# Patient Record
Sex: MALE | Race: WHITE | NOT HISPANIC OR LATINO | Employment: UNEMPLOYED | URBAN - METROPOLITAN AREA
[De-identification: names, ages, dates, MRNs, and addresses within clinical notes are randomized per-mention and may not be internally consistent; named-entity substitution may affect disease eponyms.]

---

## 2017-10-15 ENCOUNTER — HOSPITAL ENCOUNTER (OUTPATIENT)
Facility: MEDICAL CENTER | Age: 22
End: 2017-10-16
Attending: EMERGENCY MEDICINE | Admitting: INTERNAL MEDICINE

## 2017-10-15 DIAGNOSIS — F15.93 WITHDRAWAL FROM OTHER STIMULANT DRUG (HCC): ICD-10-CM

## 2017-10-15 LAB
ALBUMIN SERPL BCP-MCNC: 3.8 G/DL (ref 3.2–4.9)
ALBUMIN/GLOB SERPL: 1.9 G/DL
ALP SERPL-CCNC: 50 U/L (ref 30–99)
ALT SERPL-CCNC: 8 U/L (ref 2–50)
ANION GAP SERPL CALC-SCNC: 8 MMOL/L (ref 0–11.9)
AST SERPL-CCNC: 12 U/L (ref 12–45)
BILIRUB SERPL-MCNC: 0.7 MG/DL (ref 0.1–1.5)
BUN SERPL-MCNC: 10 MG/DL (ref 8–22)
CALCIUM SERPL-MCNC: 7.5 MG/DL (ref 8.5–10.5)
CHLORIDE SERPL-SCNC: 113 MMOL/L (ref 96–112)
CO2 SERPL-SCNC: 20 MMOL/L (ref 20–33)
CREAT SERPL-MCNC: 0.68 MG/DL (ref 0.5–1.4)
GFR SERPL CREATININE-BSD FRML MDRD: >60 ML/MIN/1.73 M 2
GLOBULIN SER CALC-MCNC: 2 G/DL (ref 1.9–3.5)
GLUCOSE SERPL-MCNC: 68 MG/DL (ref 65–99)
POTASSIUM SERPL-SCNC: 3.3 MMOL/L (ref 3.6–5.5)
PROT SERPL-MCNC: 5.8 G/DL (ref 6–8.2)
SODIUM SERPL-SCNC: 141 MMOL/L (ref 135–145)

## 2017-10-15 PROCEDURE — 36415 COLL VENOUS BLD VENIPUNCTURE: CPT

## 2017-10-15 PROCEDURE — 700105 HCHG RX REV CODE 258: Performed by: EMERGENCY MEDICINE

## 2017-10-15 PROCEDURE — 80053 COMPREHEN METABOLIC PANEL: CPT

## 2017-10-15 PROCEDURE — 96361 HYDRATE IV INFUSION ADD-ON: CPT

## 2017-10-15 PROCEDURE — 99285 EMERGENCY DEPT VISIT HI MDM: CPT

## 2017-10-15 PROCEDURE — 96374 THER/PROPH/DIAG INJ IV PUSH: CPT

## 2017-10-15 PROCEDURE — 700111 HCHG RX REV CODE 636 W/ 250 OVERRIDE (IP): Performed by: EMERGENCY MEDICINE

## 2017-10-15 RX ORDER — SODIUM CHLORIDE 9 MG/ML
1000 INJECTION, SOLUTION INTRAVENOUS ONCE
Status: COMPLETED | OUTPATIENT
Start: 2017-10-15 | End: 2017-10-16

## 2017-10-15 RX ORDER — LORAZEPAM 2 MG/ML
0.5 INJECTION INTRAMUSCULAR ONCE
Status: COMPLETED | OUTPATIENT
Start: 2017-10-15 | End: 2017-10-15

## 2017-10-15 RX ADMIN — SODIUM CHLORIDE 1000 ML: 9 INJECTION, SOLUTION INTRAVENOUS at 23:31

## 2017-10-15 RX ADMIN — LORAZEPAM 0.5 MG: 2 INJECTION INTRAMUSCULAR at 23:31

## 2017-10-16 ENCOUNTER — RESOLUTE PROFESSIONAL BILLING HOSPITAL PROF FEE (OUTPATIENT)
Dept: HOSPITALIST | Facility: MEDICAL CENTER | Age: 22
End: 2017-10-16

## 2017-10-16 VITALS
BODY MASS INDEX: 19.5 KG/M2 | RESPIRATION RATE: 16 BRPM | HEIGHT: 72 IN | DIASTOLIC BLOOD PRESSURE: 108 MMHG | SYSTOLIC BLOOD PRESSURE: 138 MMHG | HEART RATE: 98 BPM | OXYGEN SATURATION: 96 % | TEMPERATURE: 98.3 F | WEIGHT: 144 LBS

## 2017-10-16 PROBLEM — R00.0 SINUS TACHYCARDIA: Status: ACTIVE | Noted: 2017-10-16

## 2017-10-16 PROBLEM — G93.40 ENCEPHALOPATHY: Status: ACTIVE | Noted: 2017-10-16

## 2017-10-16 PROCEDURE — A9270 NON-COVERED ITEM OR SERVICE: HCPCS | Performed by: EMERGENCY MEDICINE

## 2017-10-16 PROCEDURE — 700111 HCHG RX REV CODE 636 W/ 250 OVERRIDE (IP): Performed by: INTERNAL MEDICINE

## 2017-10-16 PROCEDURE — G0378 HOSPITAL OBSERVATION PER HR: HCPCS

## 2017-10-16 PROCEDURE — 700105 HCHG RX REV CODE 258: Performed by: EMERGENCY MEDICINE

## 2017-10-16 PROCEDURE — 99204 OFFICE O/P NEW MOD 45 MIN: CPT | Performed by: INTERNAL MEDICINE

## 2017-10-16 PROCEDURE — 700105 HCHG RX REV CODE 258: Performed by: INTERNAL MEDICINE

## 2017-10-16 PROCEDURE — 96376 TX/PRO/DX INJ SAME DRUG ADON: CPT

## 2017-10-16 PROCEDURE — 96361 HYDRATE IV INFUSION ADD-ON: CPT

## 2017-10-16 PROCEDURE — 700102 HCHG RX REV CODE 250 W/ 637 OVERRIDE(OP): Performed by: EMERGENCY MEDICINE

## 2017-10-16 RX ORDER — LORAZEPAM 1 MG/1
1 TABLET ORAL EVERY 6 HOURS PRN
Status: DISCONTINUED | OUTPATIENT
Start: 2017-10-16 | End: 2017-10-16 | Stop reason: HOSPADM

## 2017-10-16 RX ORDER — PROMETHAZINE HYDROCHLORIDE 25 MG/1
12.5-25 TABLET ORAL EVERY 4 HOURS PRN
Status: DISCONTINUED | OUTPATIENT
Start: 2017-10-16 | End: 2017-10-16 | Stop reason: HOSPADM

## 2017-10-16 RX ORDER — AMOXICILLIN 250 MG
2 CAPSULE ORAL 2 TIMES DAILY
Status: DISCONTINUED | OUTPATIENT
Start: 2017-10-16 | End: 2017-10-16 | Stop reason: HOSPADM

## 2017-10-16 RX ORDER — SODIUM CHLORIDE 9 MG/ML
INJECTION, SOLUTION INTRAVENOUS CONTINUOUS
Status: DISCONTINUED | OUTPATIENT
Start: 2017-10-16 | End: 2017-10-16 | Stop reason: HOSPADM

## 2017-10-16 RX ORDER — POLYETHYLENE GLYCOL 3350 17 G/17G
1 POWDER, FOR SOLUTION ORAL
Status: DISCONTINUED | OUTPATIENT
Start: 2017-10-16 | End: 2017-10-16 | Stop reason: HOSPADM

## 2017-10-16 RX ORDER — PROMETHAZINE HYDROCHLORIDE 25 MG/1
12.5-25 SUPPOSITORY RECTAL EVERY 4 HOURS PRN
Status: DISCONTINUED | OUTPATIENT
Start: 2017-10-16 | End: 2017-10-16 | Stop reason: HOSPADM

## 2017-10-16 RX ORDER — LORAZEPAM 2 MG/ML
0.5 INJECTION INTRAMUSCULAR EVERY 6 HOURS PRN
Status: DISCONTINUED | OUTPATIENT
Start: 2017-10-16 | End: 2017-10-16

## 2017-10-16 RX ORDER — DIAZEPAM 5 MG/1
5 TABLET ORAL ONCE
Status: COMPLETED | OUTPATIENT
Start: 2017-10-16 | End: 2017-10-16

## 2017-10-16 RX ORDER — ONDANSETRON 2 MG/ML
4 INJECTION INTRAMUSCULAR; INTRAVENOUS EVERY 4 HOURS PRN
Status: DISCONTINUED | OUTPATIENT
Start: 2017-10-16 | End: 2017-10-16 | Stop reason: HOSPADM

## 2017-10-16 RX ORDER — BISACODYL 10 MG
10 SUPPOSITORY, RECTAL RECTAL
Status: DISCONTINUED | OUTPATIENT
Start: 2017-10-16 | End: 2017-10-16 | Stop reason: HOSPADM

## 2017-10-16 RX ORDER — LORAZEPAM 1 MG/1
0.5 TABLET ORAL EVERY 6 HOURS PRN
Status: DISCONTINUED | OUTPATIENT
Start: 2017-10-16 | End: 2017-10-16

## 2017-10-16 RX ORDER — ONDANSETRON 4 MG/1
4 TABLET, ORALLY DISINTEGRATING ORAL EVERY 4 HOURS PRN
Status: DISCONTINUED | OUTPATIENT
Start: 2017-10-16 | End: 2017-10-16 | Stop reason: HOSPADM

## 2017-10-16 RX ORDER — LORAZEPAM 2 MG/ML
1 INJECTION INTRAMUSCULAR EVERY 6 HOURS PRN
Status: DISCONTINUED | OUTPATIENT
Start: 2017-10-16 | End: 2017-10-16 | Stop reason: HOSPADM

## 2017-10-16 RX ORDER — SODIUM CHLORIDE 9 MG/ML
1000 INJECTION, SOLUTION INTRAVENOUS ONCE
Status: COMPLETED | OUTPATIENT
Start: 2017-10-16 | End: 2017-10-16

## 2017-10-16 RX ORDER — ACETAMINOPHEN 325 MG/1
650 TABLET ORAL EVERY 6 HOURS PRN
Status: DISCONTINUED | OUTPATIENT
Start: 2017-10-16 | End: 2017-10-16 | Stop reason: HOSPADM

## 2017-10-16 RX ADMIN — SODIUM CHLORIDE 1000 ML: 9 INJECTION, SOLUTION INTRAVENOUS at 01:15

## 2017-10-16 RX ADMIN — LORAZEPAM 1 MG: 2 INJECTION INTRAMUSCULAR; INTRAVENOUS at 03:02

## 2017-10-16 RX ADMIN — DIAZEPAM 5 MG: 5 TABLET ORAL at 01:15

## 2017-10-16 RX ADMIN — SODIUM CHLORIDE: 9 INJECTION, SOLUTION INTRAVENOUS at 04:08

## 2017-10-16 ASSESSMENT — LIFESTYLE VARIABLES
EVER HAD A DRINK FIRST THING IN THE MORNING TO STEADY YOUR NERVES TO GET RID OF A HANGOVER: NO
HAVE YOU EVER FELT YOU SHOULD CUT DOWN ON YOUR DRINKING: NO
HOW MANY TIMES IN THE PAST YEAR HAVE YOU HAD 5 OR MORE DRINKS IN A DAY: 0
TOTAL SCORE: 0
ALCOHOL_USE: YES
EVER FELT BAD OR GUILTY ABOUT YOUR DRINKING: NO
CONSUMPTION TOTAL: NEGATIVE
AVERAGE NUMBER OF DAYS PER WEEK YOU HAVE A DRINK CONTAINING ALCOHOL: 1
TOTAL SCORE: 0
EVER_SMOKED: NEVER
TOTAL SCORE: 0
HAVE PEOPLE ANNOYED YOU BY CRITICIZING YOUR DRINKING: NO
ON A TYPICAL DAY WHEN YOU DRINK ALCOHOL HOW MANY DRINKS DO YOU HAVE: 1

## 2017-10-16 ASSESSMENT — ENCOUNTER SYMPTOMS
MYALGIAS: 0
SPEECH CHANGE: 0
PHOTOPHOBIA: 0
CONSTIPATION: 0
CHILLS: 0
DIZZINESS: 0
DIAPHORESIS: 1
NERVOUS/ANXIOUS: 1
VOMITING: 0
BLURRED VISION: 0
FEVER: 0
HEADACHES: 0
SHORTNESS OF BREATH: 0
DIARRHEA: 0
ABDOMINAL PAIN: 0
COUGH: 0
HEARTBURN: 0
SENSORY CHANGE: 0
CLAUDICATION: 0
INSOMNIA: 0
WEAKNESS: 0
DEPRESSION: 0

## 2017-10-16 ASSESSMENT — PATIENT HEALTH QUESTIONNAIRE - PHQ9
SUM OF ALL RESPONSES TO PHQ9 QUESTIONS 1 AND 2: 0
1. LITTLE INTEREST OR PLEASURE IN DOING THINGS: NOT AT ALL
2. FEELING DOWN, DEPRESSED, IRRITABLE, OR HOPELESS: NOT AT ALL
SUM OF ALL RESPONSES TO PHQ QUESTIONS 1-9: 0

## 2017-10-16 ASSESSMENT — PAIN SCALES - GENERAL: PAINLEVEL_OUTOF10: 0

## 2017-10-16 NOTE — ED NOTES
Pt is starting to get anxious, he put his coat on to leave but sat back down into bed when redirected. Pt medicated for anxiety and updated on poc.

## 2017-10-16 NOTE — ED PROVIDER NOTES
"ED Provider Note    CHIEF COMPLAINT  Chief Complaint   Patient presents with   • Drug Ingestion   • Medical Clearance       HPI  Corona Ramírez is a 22 y.o. male here for evaluation after he was pulled over for erratic driving. The patient admits to using ecstasy tonight, and maybe some other drugs. He has no medical complaints at this time, and states \"I'm just chilling.\" He has no chest pain, no shortness of breath, and no abdominal pain.    PAST MEDICAL HISTORY   drug use    SOCIAL HISTORY  Social History     Social History Main Topics   • Smoking status: Not on file   • Smokeless tobacco: Not on file   • Alcohol use Not on file   • Drug use: Unknown   • Sexual activity: Not on file       SURGICAL HISTORY  patient denies any surgical history    CURRENT MEDICATIONS  Home Medications    **Home medications have not yet been reviewed for this encounter**         ALLERGIES  No Known Allergies    REVIEW OF SYSTEMS  See HPI for further details. Review of systems as above, otherwise all other systems are negative.     PHYSICAL EXAM  VITAL SIGNS: /95   Pulse (!) 123   Temp 37.5 °C (99.5 °F)   Resp 18   Ht 1.829 m (6')   Wt 65.3 kg (144 lb)   SpO2 91%   BMI 19.53 kg/m²     Constitutional: Well developed, well nourished. Moderate acute distress.  HEENT: Normocephalic, atraumatic. MMM  Neck: Supple, Full range of motion   Chest/Pulmonary:  No respiratory distress.  Equal expansion.  Tachycardic  Musculoskeletal: No deformity, no edema, neurovascular intact.   Neuro: Slurred speech, appropriate, cooperative, cranial nerves II-XII grossly intact. Tremor   Psych: Normal mood and affect  Skin:  Diaphoretic, warm    Results for orders placed or performed during the hospital encounter of 10/15/17   COMP METABOLIC PANEL   Result Value Ref Range    Sodium 141 135 - 145 mmol/L    Potassium 3.3 (L) 3.6 - 5.5 mmol/L    Chloride 113 (H) 96 - 112 mmol/L    Co2 20 20 - 33 mmol/L    Anion Gap 8.0 0.0 - 11.9    Glucose 68 " "65 - 99 mg/dL    Bun 10 8 - 22 mg/dL    Creatinine 0.68 0.50 - 1.40 mg/dL    Calcium 7.5 (L) 8.5 - 10.5 mg/dL    AST(SGOT) 12 12 - 45 U/L    ALT(SGPT) 8 2 - 50 U/L    Alkaline Phosphatase 50 30 - 99 U/L    Total Bilirubin 0.7 0.1 - 1.5 mg/dL    Albumin 3.8 3.2 - 4.9 g/dL    Total Protein 5.8 (L) 6.0 - 8.2 g/dL    Globulin 2.0 1.9 - 3.5 g/dL    A-G Ratio 1.9 g/dL   ESTIMATED GFR   Result Value Ref Range    GFR If African American >60 >60 mL/min/1.73 m 2    GFR If Non African American >60 >60 mL/min/1.73 m 2          PROCEDURES     MEDICAL RECORD  I have reviewed patient's medical record and pertinent results are listed above.    COURSE & MEDICAL DECISION MAKING  I have reviewed any medical record information, laboratory studies and radiographic results as noted above.    1:09 AM  Pt remains tachycardic.  He has not yet given us a urine sample, but states that he \"feels fine.\" He will be admitted to the hospitalist, for IV fluids and evaluation.    FINAL IMPRESSION  1. Withdrawal from other stimulant drug (CMS-[facility])          Electronically signed by: Bernardo Bustillo, 10/15/2017 11:27 PM      "

## 2017-10-16 NOTE — PROGRESS NOTES
Hospital Medicine History and Physical    Date of Service  10/16/2017    Chief Complaint  Chief Complaint   Patient presents with   • Drug Ingestion   • Medical Clearance       History of Presenting Illness  22 y.o. male who presented 10/15/2017 with diaphoresis and altered mental status. Patient brought in by law enforcement after he and the people in the car with him were pulled over for driving erratically. Patient admits to taking ecstasy and eventually admitted to taking cocaine and marijuana. Patient states she's taken as before and is felt like this before and is wondering where his friends and money and phone are. Urine drug screen requested and patient stated that he was able to urinate but is yet to provide a sample. Patient will be admitted for observation until his mental status improves. Law enforcement is to be called when patient is ready for discharge, contact information is in the chart.    Primary Care Physician  No primary care provider on file.    Consultants  None    Code Status  Full    Review of Systems  Review of Systems   Constitutional: Positive for diaphoresis. Negative for chills and fever.   HENT: Negative for congestion.    Eyes: Negative for blurred vision and photophobia.   Respiratory: Negative for cough and shortness of breath.    Cardiovascular: Negative for chest pain, claudication and leg swelling.   Gastrointestinal: Negative for abdominal pain, constipation, diarrhea, heartburn and vomiting.   Genitourinary: Negative for dysuria and hematuria.   Musculoskeletal: Negative for joint pain and myalgias.   Skin: Negative for itching and rash.   Neurological: Negative for dizziness, sensory change, speech change, weakness and headaches.   Psychiatric/Behavioral: Negative for depression. The patient is nervous/anxious. The patient does not have insomnia.         Past Medical History  No past medical history on file.  Drug use    Surgical History  No past surgical history on  file.  Patient denies    Medications  No current facility-administered medications on file prior to encounter.      No current outpatient prescriptions on file prior to encounter.   None    Family History  History reviewed. No pertinent family history.    Social History  Social History   Substance Use Topics   • Smoking status: Not on file   • Smokeless tobacco: Not on file   • Alcohol use Not on file   Frequent use of marijuana  Uses cocaine and ecstasy    Allergies  No Known Allergies     Physical Exam  Laboratory   Hemodynamics  Temp (24hrs), Av.2 °C (98.9 °F), Min:36.8 °C (98.3 °F), Max:37.5 °C (99.5 °F)   Temperature: 36.8 °C (98.3 °F)  Pulse  Av  Min: 98  Max: 123 Heart Rate (Monitored): (!) 113  Blood Pressure: 138/108, NIBP: 135/93      Respiratory      Respiration: 16, Pulse Oximetry: 96 %             Physical Exam   Constitutional: He is oriented to person, place, and time. He appears well-developed and well-nourished. No distress.   HENT:   Head: Normocephalic and atraumatic.   Eyes: Conjunctivae are normal. No scleral icterus.   Neck: Neck supple. No JVD present.   Cardiovascular: Normal rate and regular rhythm.  Exam reveals no gallop and no friction rub.    No murmur heard.  Pulmonary/Chest: Effort normal and breath sounds normal. No respiratory distress. He has no wheezes. He exhibits no tenderness.   Abdominal: Soft. Bowel sounds are normal. He exhibits no distension and no mass. There is no tenderness.   Musculoskeletal: He exhibits no edema or tenderness.   Neurological: He is alert and oriented to person, place, and time. No cranial nerve deficit.   Skin: Skin is warm and dry. He is not diaphoretic. No erythema. No pallor.   Psychiatric: He has a normal mood and affect. His behavior is normal.   Nursing note and vitals reviewed.          Recent Labs      10/15/17   2310   SODIUM  141   POTASSIUM  3.3*   CHLORIDE  113*   CO2  20   GLUCOSE  68   BUN  10   CREATININE  0.68   CALCIUM  7.5*      Recent Labs      10/15/17   2310   ALTSGPT  8   ASTSGOT  12   ALKPHOSPHAT  50   TBILIRUBIN  0.7   GLUCOSE  68                 No results found for: TROPONINI  Urinalysis:  No results found for: SPECGRAVITY, GLUCOSEUR, KETONES, NITRITE, WBCURINE, RBCURINE, BACTERIA, EPITHELCELL     Imaging  none   Assessment/Plan     I anticipate this patient is appropriate for observation status at this time.    Encephalopathy   Assessment & Plan    Patient mentation better than when he arrived per report.  We'll admit for further observation        Drug ingestion   Assessment & Plan    U tox pending  Patient admits to ecstasy use today and later admitted to the nurse of marijuana and cocaine as well.  Patient diaphoretic and tachycardic        Sinus tachycardia   Assessment & Plan    Secondary to drug ingestion              VTE prophylaxis:None needed as patient is ambulatory .

## 2017-10-16 NOTE — ED NOTES
Pt bib EMS, pt and his friend ingested MDMA and were pulled over by PD for erradic driving. Pt is from [place], states he is homeless and is obviously intoxicated, PD was unable to leave him on the side of the road and had no grounds for arrest. Therefore the pt was brought to the ED. Pt is diaphoretic, tachycardic, AxOx4, calm and cooperative. Pt had has agreed to submit to a phlebotomist blood draw, PD is at the bedside.

## 2017-10-16 NOTE — ASSESSMENT & PLAN NOTE
U tox pending  Patient admits to ecstasy use today and later admitted to the nurse of marijuana and cocaine as well.  Patient diaphoretic and tachycardic

## 2017-10-16 NOTE — ED NOTES
Gildardo Patel would like to be called when the pt is ready for discharge. Pt may be released into police custody. Dispatch [phone] or Jorge's cell [phone]

## 2017-10-16 NOTE — ED NOTES
Attempted to place pt on cardiac monitor but the pt is actively sweating and the pads will not stick to the pt. Pt's pulse, BP and O2 will be monitored.

## 2017-10-16 NOTE — ED NOTES
NS infusion complete, pt ambulated to the bathroom, attempted to give a urine sample but is unable to provide one at this time. The pt continues to be diaphoretic with generalized tremors. Pt is polite and calm.

## 2017-10-16 NOTE — ED NOTES
Pt continues to be diaphoretic but is able to be placed on cardiac monitor. The pt has continued tremors and tachycardia. 2 L of fluid infusing, pt medicated for anxiety. He is attempting to call his girlfriend that lives in [place] and is waiting for admit.

## 2017-10-17 NOTE — DISCHARGE SUMMARY
Hospital Medicine Discharge Note     Admit Date:  10/15/2017       Discharge Date:   10/16/2017    Attending Physician:  Matthew Leal M.D.      Diagnoses (includes active and resolved):     Active Problems:    Sinus tachycardia POA: Unknown    Drug ingestion POA: Unknown    Encephalopathy POA: Unknown     Patient went against medical advice    Hospital Summary (Brief Narrative):         22-year-old male history of ecstasy, cocaine and marijuana use was admitted with diaphoresis and altered mental status.  He had been pulled over and brought in for driving erratically. Follow admission patient had findings of low potassium 3.3.  He was monitored on telemetry as altered mental status cleared. He became more restless despite attempts to convince him to stay for further treatment he left against medical advice.  [place] PD was contacted     Consultants:        None         Procedures:          None         Time spent on discharge day patient visit: 35 minutes    #################################################

## 2017-10-24 NOTE — H&P
Ellie Almonte D.O. Physician Signed Hospital Medicine  Progress Notes Date of Service: 10/16/2017  6:29 AM      Expand All Collapse All    []Hide copied text  []Hover for attribution information   Hospital Medicine History and Physical     Date of Service  10/16/2017     Chief Complaint      Chief Complaint   Patient presents with   • Drug Ingestion   • Medical Clearance         History of Presenting Illness  22 y.o. male who presented 10/15/2017 with diaphoresis and altered mental status. Patient brought in by law enforcement after he and the people in the car with him were pulled over for driving erratically. Patient admits to taking ecstasy and eventually admitted to taking cocaine and marijuana. Patient states she's taken as before and is felt like this before and is wondering where his friends and money and phone are. Urine drug screen requested and patient stated that he was able to urinate but is yet to provide a sample. Patient will be admitted for observation until his mental status improves. Law enforcement is to be called when patient is ready for discharge, contact information is in the chart.      Primary Care Physician  No primary care provider on file.     Consultants  None     Code Status  Full     Review of Systems  Review of Systems   Constitutional: Positive for diaphoresis. Negative for chills and fever.   HENT: Negative for congestion.    Eyes: Negative for blurred vision and photophobia.   Respiratory: Negative for cough and shortness of breath.    Cardiovascular: Negative for chest pain, claudication and leg swelling.   Gastrointestinal: Negative for abdominal pain, constipation, diarrhea, heartburn and vomiting.   Genitourinary: Negative for dysuria and hematuria.   Musculoskeletal: Negative for joint pain and myalgias.   Skin: Negative for itching and rash.   Neurological: Negative for dizziness, sensory change, speech change, weakness and headaches.   Psychiatric/Behavioral: Negative for  depression. The patient is nervous/anxious. The patient does not have insomnia.        Past Medical History  No past medical history on file.  Drug use     Surgical History  No past surgical history on file.  Patient denies     Medications  No current facility-administered medications on file prior to encounter.       No current outpatient prescriptions on file prior to encounter.   None   Family History  Family History   History reviewed. No pertinent family history.        Social History       Social History   Substance Use Topics   • Smoking status: Not on file   • Smokeless tobacco: Not on file   • Alcohol use Not on file   Frequent use of marijuana  Uses cocaine and ecstasy     Allergies  No Known Allergies   Physical Exam   Laboratory   Hemodynamics  Temp (24hrs), Av.2 °C (98.9 °F), Min:36.8 °C (98.3 °F), Max:37.5 °C (99.5 °F)   Temperature: 36.8 °C (98.3 °F)  Pulse  Av  Min: 98  Max: 123 Heart Rate (Monitored): (!) 113  Blood Pressure: 138/108, NIBP: 135/93       Respiratory      Respiration: 16, Pulse Oximetry: 96 %              Physical Exam   Constitutional: He is oriented to person, place, and time. He appears well-developed and well-nourished. No distress.   HENT:   Head: Normocephalic and atraumatic.   Eyes: Conjunctivae are normal. No scleral icterus.   Neck: Neck supple. No JVD present.   Cardiovascular: Normal rate and regular rhythm.  Exam reveals no gallop and no friction rub.    No murmur heard.  Pulmonary/Chest: Effort normal and breath sounds normal. No respiratory distress. He has no wheezes. He exhibits no tenderness.   Abdominal: Soft. Bowel sounds are normal. He exhibits no distension and no mass. There is no tenderness.   Musculoskeletal: He exhibits no edema or tenderness.   Neurological: He is alert and oriented to person, place, and time. No cranial nerve deficit.   Skin: Skin is warm and dry. He is not diaphoretic. No erythema. No pallor.   Psychiatric: He has a normal mood  and affect. His behavior is normal.   Nursing note and vitals reviewed.              Recent Labs      10/15/17   2310   SODIUM  141   POTASSIUM  3.3*   CHLORIDE  113*   CO2  20   GLUCOSE  68   BUN  10   CREATININE  0.68   CALCIUM  7.5*          Recent Labs      10/15/17   2310   ALTSGPT  8   ASTSGOT  12   ALKPHOSPHAT  50   TBILIRUBIN  0.7   GLUCOSE  68                  No results found for: TROPONINI  Urinalysis:  No results found for: SPECGRAVITY, GLUCOSEUR, KETONES, NITRITE, WBCURINE, RBCURINE, BACTERIA, EPITHELCELL      Imaging  none   Assessment/Plan       I anticipate this patient is appropriate for observation status at this time.         Encephalopathy   Assessment & Plan     Patient mentation better than when he arrived per report.  We'll admit for further observation       Drug ingestion   Assessment & Plan     U tox pending  Patient admits to ecstasy use today and later admitted to the nurse of marijuana and cocaine as well.  Patient diaphoretic and tachycardic       Sinus tachycardia   Assessment & Plan     Secondary to drug ingestion                VTE prophylaxis:None needed as patient is ambulatory .

## 2020-10-03 NOTE — ED NOTES
Pt is up and walking around his room, he keeps coming out and asking where is phone and wallet are, he asked what happened to his friend. This RN explained his friend went to long term and the pt would have gone into police custody but he is medically unstable.    Pt was getting up for ambulation and nurse found an Atenolol. Since pt has high BP today 
puled one atenolol out of [employer] cell to replace.